# Patient Record
Sex: MALE | Race: WHITE | NOT HISPANIC OR LATINO | URBAN - METROPOLITAN AREA
[De-identification: names, ages, dates, MRNs, and addresses within clinical notes are randomized per-mention and may not be internally consistent; named-entity substitution may affect disease eponyms.]

---

## 2022-01-20 ENCOUNTER — TELEPHONE (OUTPATIENT)
Dept: UROLOGY | Facility: CLINIC | Age: 52
End: 2022-01-20
Payer: MEDICARE

## 2022-01-20 NOTE — TELEPHONE ENCOUNTER
----- Message from Xiao Jimmy sent at 1/20/2022 10:25 AM CST -----  Contact: Patient  Type: Patient Call Back         Who called: Patient         What is the request in detail: calling to sched from referral for BPH with urinary obstruction [N40.1, N13.8]  Chronic prostatitis [N41.1]; please advise         Can the clinic reply by [facility]? Call back         Would the patient rather a call back or a response via [facility]? Call back         Best call back number: [phone]         Additional Information: prefers mornings           Thank You

## 2022-01-20 NOTE — TELEPHONE ENCOUNTER
Scheduled pt for soonest availability per pt's morning preference at 9am on 4/8 with  and placed on waitlist. Spoke to pt and pt verbalized he understood and confirmed appt time and date.

## 2022-03-10 ENCOUNTER — OFFICE VISIT (OUTPATIENT)
Dept: HEMATOLOGY/ONCOLOGY | Facility: CLINIC | Age: 52
End: 2022-03-10
Payer: MEDICARE

## 2022-03-10 VITALS
WEIGHT: 190.69 LBS | TEMPERATURE: 98 F | HEIGHT: 69 IN | SYSTOLIC BLOOD PRESSURE: 150 MMHG | HEART RATE: 74 BPM | DIASTOLIC BLOOD PRESSURE: 88 MMHG | RESPIRATION RATE: 16 BRPM | BODY MASS INDEX: 28.24 KG/M2 | OXYGEN SATURATION: 98 %

## 2022-03-10 DIAGNOSIS — D72.829 LEUKOCYTOSIS, UNSPECIFIED TYPE: Primary | ICD-10-CM

## 2022-03-10 DIAGNOSIS — Z72.0 TOBACCO ABUSE: ICD-10-CM

## 2022-03-10 PROCEDURE — 99999 PR PBB SHADOW E&M-EST. PATIENT-LVL V: CPT | Mod: PBBFAC,,, | Performed by: INTERNAL MEDICINE

## 2022-03-10 PROCEDURE — 99215 OFFICE O/P EST HI 40 MIN: CPT | Mod: PBBFAC,PN | Performed by: INTERNAL MEDICINE

## 2022-03-10 PROCEDURE — 99204 OFFICE O/P NEW MOD 45 MIN: CPT | Mod: S$PBB,,, | Performed by: INTERNAL MEDICINE

## 2022-03-10 PROCEDURE — 99204 PR OFFICE/OUTPT VISIT, NEW, LEVL IV, 45-59 MIN: ICD-10-PCS | Mod: S$PBB,,, | Performed by: INTERNAL MEDICINE

## 2022-03-10 PROCEDURE — 99999 PR PBB SHADOW E&M-EST. PATIENT-LVL V: ICD-10-PCS | Mod: PBBFAC,,, | Performed by: INTERNAL MEDICINE

## 2022-03-10 NOTE — Clinical Note
Obtain cbc with path review, cmp ldh FISH for bcr-abl, DARIO 2 Kinase, flow cytometry, 3 view sinus xray, and low dose screening CT-Chest. Rtc with results.

## 2022-03-10 NOTE — PROGRESS NOTES
Chief complaint:  Leukocytosis    History of present illness:  The patient is a 52-year-old white gentleman who presents for evaluation of chronic leukocytosis.  Review of old laboratory as exists within the [facility] system dates back as far as 2018. Patient has had white counts ranging from 10 -18K.  During this time, patient has had normal indices, no evidence of anemia, and predominantly normal platelet counts.  Differentials have exhibited left shift.  Patient is a 1 pack per day cigarette smoker.  He has done so for 40 years.  He has never been screened for lung carcinoma.  Patient has had prior difficulties with sinusitis and has undergone sinus surgery.  He continues to suffer allergy symptoms, postnasal drip and cough.  Patient is not experiencing fevers, chills, painful lymphadenopathy, night sweats, and unintentional weight loss.  Patient does not routinely receive steroid medications and does not use supplemental testosterone.    Past medical history:  1. Hypertension  2. Hyperlipidemia  3. Insomnia  4. Erectile dysfunction  5. Benign prostatic hypertrophy  6. Status post sinus surgery  7. Spina bifida-status post surgical correction following birth    Allergies:  1. Lisinopril    Medications:  1. Norvasc 5 mg p.o. daily  2. Lipitor 40 mg p.o. daily  3. Cozaar 50 mg p.o. daily  4. Melatonin 3 mg nightly as needed for insomnia  5. Fish oil 1000 mg daily  6. Viagra 100 mg as needed  7. Flomax 0.4 mg p.o. daily    Family/social history:  One pack per day smoker times 40 years.  Patient has never been able to quit.  Patient does not consume alcohol.  Patient is retired from the [employer].  Father is  due to heart failure.  Mother is in good health.    Physical examination:  Well-developed, well-nourished, white gentleman, in no acute distress, who has a weight of 190.5 lb.  VITAL SIGNS: Documented  and reviewed this visit.  HEENT: Normocephalic, atraumatic. Oral mucosa pink and moist. Lips without  lesions. Tongue midline. Oropharynx clear. Nonicteric sclerae.   NECK: Supple, no adenopathy. No carotid bruits, thyromegaly or thyroid nodule.   HEART: Regular rate and rhythm without murmur, gallop or rub.   LUNGS: Clear to auscultation bilaterally. Normal respiratory effort.   ABDOMEN: Soft, nontender, nondistended with positive normoactive bowel sounds, no hepatosplenomegaly.   EXTREMITIES: No cyanosis, clubbing or edema. Distal pulses are intact.   AXILLAE AND GROIN: No palpable pathologic lymphadenopathy is appreciated.   SKIN: Intact/turgor normal   NEUROLOGIC: Cranial nerves II-XII grossly intact. Motor: Good muscle bulk and tone. Strength/sensory 5/5 throughout. Gait stable.     Laboratory:  Most recent studies were obtained on 03/03/2022.  White count 17, hemoglobin 14.1, hematocrit 41.6, platelets 335, MCV 95, RDW 13.3.  Differential is remarkable for 71% granulocytes, 20% lymphocytes, 6% monocytes, and 2% eosinophils.  Absolute neutrophil count is 12,000.  Most recent chemistries were obtained on 2/22/22.  Sodium 138, potassium 4.3, chloride 107, CO2 24, BUN 9, creatinine 0.9, glucose 96, calcium 8.8, liver function test within normal limits, GFR is greater than 60.     Impression:  1. Chronic, idiopathic leukocytosis with left shift.  2. Ongoing tobacco abuse.    Plan:  1. Obtain CBC with pathologist's review of the peripheral smear, CMP, LDH, FISH for bcr-ABL, JAK2 kinase mutation, flow cytometry, low-dose screening CT of the chest, and three view sinus film.  2. Return to clinic in earliest convenience to review results.    3. Counseled regarding smoking cessation.  Patient has accept a referral to smoking cessation program.    This note was created using voice recognition software and may contain grammatical errors.

## 2022-03-23 ENCOUNTER — HOSPITAL ENCOUNTER (OUTPATIENT)
Dept: RADIOLOGY | Facility: HOSPITAL | Age: 52
Discharge: HOME | End: 2022-03-23
Attending: INTERNAL MEDICINE
Payer: MEDICARE

## 2022-03-23 DIAGNOSIS — D72.829 LEUKOCYTOSIS, UNSPECIFIED TYPE: ICD-10-CM

## 2022-03-23 DIAGNOSIS — Z72.0 TOBACCO ABUSE: ICD-10-CM

## 2022-03-23 PROCEDURE — 71271 CT CHEST LUNG SCREENING LOW DOSE: ICD-10-PCS | Mod: 26,,, | Performed by: RADIOLOGY

## 2022-03-23 PROCEDURE — 70220 X-RAY EXAM OF SINUSES: CPT | Mod: TC,FY,PO

## 2022-03-23 PROCEDURE — 70220 XR SINUSES MIN 3 VIEWS: ICD-10-PCS | Mod: 26,,, | Performed by: RADIOLOGY

## 2022-03-23 PROCEDURE — 70220 X-RAY EXAM OF SINUSES: CPT | Mod: 26,,, | Performed by: RADIOLOGY

## 2022-03-23 PROCEDURE — 71271 CT THORAX LUNG CANCER SCR C-: CPT | Mod: TC,PO

## 2022-03-23 PROCEDURE — 71271 CT THORAX LUNG CANCER SCR C-: CPT | Mod: 26,,, | Performed by: RADIOLOGY

## 2022-03-28 ENCOUNTER — TELEPHONE (OUTPATIENT)
Dept: SMOKING CESSATION | Facility: CLINIC | Age: 52
End: 2022-03-28
Payer: MEDICARE

## 2022-04-06 ENCOUNTER — OFFICE VISIT (OUTPATIENT)
Dept: HEMATOLOGY/ONCOLOGY | Facility: CLINIC | Age: 52
End: 2022-04-06
Payer: MEDICARE

## 2022-04-06 VITALS
BODY MASS INDEX: 28.44 KG/M2 | DIASTOLIC BLOOD PRESSURE: 93 MMHG | SYSTOLIC BLOOD PRESSURE: 149 MMHG | HEART RATE: 88 BPM | OXYGEN SATURATION: 98 % | WEIGHT: 192 LBS | TEMPERATURE: 98 F | RESPIRATION RATE: 16 BRPM | HEIGHT: 69 IN

## 2022-04-06 DIAGNOSIS — D72.829 LEUKOCYTOSIS, UNSPECIFIED TYPE: Primary | ICD-10-CM

## 2022-04-06 DIAGNOSIS — J32.0 MAXILLARY SINUSITIS, UNSPECIFIED CHRONICITY: ICD-10-CM

## 2022-04-06 DIAGNOSIS — Z72.0 TOBACCO ABUSE: ICD-10-CM

## 2022-04-06 PROCEDURE — 99214 OFFICE O/P EST MOD 30 MIN: CPT | Mod: PBBFAC,PN | Performed by: INTERNAL MEDICINE

## 2022-04-06 PROCEDURE — 99214 PR OFFICE/OUTPT VISIT, EST, LEVL IV, 30-39 MIN: ICD-10-PCS | Mod: S$PBB,,, | Performed by: INTERNAL MEDICINE

## 2022-04-06 PROCEDURE — 99999 PR PBB SHADOW E&M-EST. PATIENT-LVL IV: CPT | Mod: PBBFAC,,, | Performed by: INTERNAL MEDICINE

## 2022-04-06 PROCEDURE — 99999 PR PBB SHADOW E&M-EST. PATIENT-LVL IV: ICD-10-PCS | Mod: PBBFAC,,, | Performed by: INTERNAL MEDICINE

## 2022-04-06 PROCEDURE — 99214 OFFICE O/P EST MOD 30 MIN: CPT | Mod: S$PBB,,, | Performed by: INTERNAL MEDICINE

## 2022-04-06 NOTE — PROGRESS NOTES
History of present illness:  The patient is a 52-year-old white gentleman who presents for evaluation of chronic leukocytosis.  Review of old laboratory as exists within the [facility] system dates back as far as July of 2018. Patient has had white counts ranging from 10 -18K.  During this time, patient has had normal indices, no evidence of anemia, and predominantly normal platelet counts.  Differentials have exhibited left shift.  Patient is a 1 pack per day cigarette smoker.  He has done so for 40 years.  He has never been screened for lung carcinoma.  Patient has had prior difficulties with sinusitis and has undergone sinus surgery.  He continues to suffer allergy symptoms, postnasal drip and cough.  Patient is not experiencing fevers, chills, painful lymphadenopathy, night sweats, and unintentional weight loss.  Patient does not routinely receive steroid medications and does not use supplemental testosterone.    Patient returns to clinic to review results of interval workup.  Since last office evaluation, patient has cut his smoking consumption by half.  Patient missed referral to smoking cessation program as 1 of his children have become ill.  He wishes to reschedule.    Physical examination:  Well-developed, well-nourished, white gentleman, in no acute distress, who has a weight of 192 lb (increased by 1.5 lb).  VITAL SIGNS: Documented  and reviewed this visit.  HEENT: Normocephalic, atraumatic. Oral mucosa pink and moist. Lips without lesions. Tongue midline. Oropharynx clear. Nonicteric sclerae.   NECK: Supple, no adenopathy. No carotid bruits, thyromegaly or thyroid nodule.   HEART: Regular rate and rhythm without murmur, gallop or rub.   LUNGS: Clear to auscultation bilaterally. Normal respiratory effort.   ABDOMEN: Soft, nontender, nondistended with positive normoactive bowel sounds, no hepatosplenomegaly.   EXTREMITIES: No cyanosis, clubbing or edema. Distal pulses are intact.   AXILLAE AND GROIN: No  palpable pathologic lymphadenopathy is appreciated.   SKIN: Intact/turgor normal   NEUROLOGIC: Cranial nerves II-XII grossly intact. Motor: Good muscle bulk and tone. Strength/sensory 5/5 throughout. Gait stable.     Laboratory:  White count 10.8, hemoglobin 14.6, hematocrit 42.3, platelets 317, absolute neutrophil count is 7100.  Pathologist review the peripheral smear does not reveal any morphologic abnormalities.  Sodium 138, potassium 4.8, chloride 106, CO2 23, BUN 10, creatinine 1.2, glucose 108, calcium 9.1, liver function tests are within normal limits, LDH is 135, GFR is greater than 60.  FISH for bcr-ABL is negative.  JAK2 kinase mutation is negative.    Three view sinus film:  Findings raise the possibility of acute left maxillary sinusitis.    Low-dose, spiral, screening CT of the chest:  Lung-RADS category 1 with annual follow-up recommended.      Impression:  1. Chronic leukocytosis with left shift likely reactive due to tobacco abuse and/or sinusitis.  2. Ongoing tobacco abuse-making active efforts at cessation.  3. Left maxillary sinusitis.    Plan:  1. Continue efforts at smoking cessation.  2. Reschedule referral to smoking cessation appointment.  3. Consult ENT for evaluation of left maxillary sinusitis.  4. Re-evaluate in 1 year with interval CBC and repeat spiral CT of the chest.      This note was created using voice recognition software and may contain grammatical errors.

## 2022-04-06 NOTE — Clinical Note
Please reschedule referral to smoking cessation program.  The patient missed this appointment due to illness of 1 of his children. ENT referral to Dr. Esteves for evaluation of left maxillary sinusitis. Return to clinic in 1 year with interval CBC and low-dose spiral CT of the chest prior to appointment.  Patient may see nurse practitioner for that visit.

## 2022-04-08 ENCOUNTER — OFFICE VISIT (OUTPATIENT)
Dept: UROLOGY | Facility: CLINIC | Age: 52
End: 2022-04-08
Payer: MEDICARE

## 2022-04-08 VITALS — WEIGHT: 192 LBS | HEIGHT: 69 IN | BODY MASS INDEX: 28.44 KG/M2

## 2022-04-08 DIAGNOSIS — Z30.09 VASECTOMY EVALUATION: ICD-10-CM

## 2022-04-08 DIAGNOSIS — R35.0 INCREASED URINARY FREQUENCY: ICD-10-CM

## 2022-04-08 DIAGNOSIS — N13.8 ENLARGED PROSTATE WITH URINARY OBSTRUCTION: Primary | ICD-10-CM

## 2022-04-08 DIAGNOSIS — N40.1 ENLARGED PROSTATE WITH URINARY OBSTRUCTION: Primary | ICD-10-CM

## 2022-04-08 LAB
BILIRUB SERPL-MCNC: NORMAL MG/DL
BLOOD URINE, POC: NORMAL
CLARITY, POC UA: CLEAR
COLOR, POC UA: YELLOW
GLUCOSE UR QL STRIP: NORMAL
KETONES UR QL STRIP: NORMAL
LEUKOCYTE ESTERASE URINE, POC: NORMAL
NITRITE, POC UA: NORMAL
PH, POC UA: 5.5
PROTEIN, POC: NORMAL
SPECIFIC GRAVITY, POC UA: 1.03
UROBILINOGEN, POC UA: 0.2

## 2022-04-08 PROCEDURE — 99213 OFFICE O/P EST LOW 20 MIN: CPT | Mod: PBBFAC,PO | Performed by: UROLOGY

## 2022-04-08 PROCEDURE — 99204 OFFICE O/P NEW MOD 45 MIN: CPT | Mod: S$PBB,,, | Performed by: UROLOGY

## 2022-04-08 PROCEDURE — 81002 URINALYSIS NONAUTO W/O SCOPE: CPT | Mod: PBBFAC,PO | Performed by: UROLOGY

## 2022-04-08 PROCEDURE — 99204 PR OFFICE/OUTPT VISIT, NEW, LEVL IV, 45-59 MIN: ICD-10-PCS | Mod: S$PBB,,, | Performed by: UROLOGY

## 2022-04-08 PROCEDURE — 99999 PR PBB SHADOW E&M-EST. PATIENT-LVL III: ICD-10-PCS | Mod: PBBFAC,,, | Performed by: UROLOGY

## 2022-04-08 PROCEDURE — 99999 PR PBB SHADOW E&M-EST. PATIENT-LVL III: CPT | Mod: PBBFAC,,, | Performed by: UROLOGY

## 2022-04-08 RX ORDER — DIAZEPAM 10 MG/1
10 TABLET ORAL ONCE
Qty: 1 TABLET | Refills: 0 | Status: SHIPPED | OUTPATIENT
Start: 2022-04-08 | End: 2022-09-06

## 2022-04-08 NOTE — PROGRESS NOTES
Subjective:       Patient ID: Willian Carr is a 52 y.o. male.    Chief Complaint: Other (Chronic prostatitis)    HPI     52-year-old with pain over his right hip and lower back.  The pain is constant but worse when he is walking.  He was diagnosed with prostatitis.  He has urinary frequency which has been a long-term problem..  He was given a trial of Flomax which he took for 1 week but this caused sexual side effects and he is now discontinued the medication.  He denies hematuria and dysuria.  He has not had any previous urinary tract infections.  He has no family history of prostate cancer.  We discussed alternative treatment options for BPH including surgery.  He says his symptoms are not very bothersome and he prefers not to take any additional medication at this time.  He also is interested in a vasectomy.  He has 3 kids and desires permanent sterility.  He is currently single. He says he has given this careful thought and he knows that vasectomy will result in permanent sterility.   I explained the procedure in detail.  We discussed the risks including bleeding, infection, hematoma, persistent pain and failure.  He knows that he will not be immediately sterile and that he should use alternative birth control until azospermia can be confirmed microscopically.  Discussed the expected post procedure pain and limitations.  He was given an Rx for Valium to be taken before procedure and he knows that he cannot drive home.  He was given written instructions and I answered all questions.    Urine dipstick shows negative for all components.    Past Medical History:   Diagnosis Date    Depression     Diabetes mellitus, type 2     Diverticula of intestine     Hyperlipidemia     Hypertension     PTSD (post-traumatic stress disorder)      Past Surgical History:   Procedure Laterality Date    SINUS SURGERY         Current Outpatient Medications:     amLODIPine (NORVASC) 5 MG tablet, Take 1 tablet (5 mg total) by  mouth once daily., Disp: 90 tablet, Rfl: 0    atorvastatin (LIPITOR) 40 MG tablet, Take 1 tablet (40 mg total) by mouth once daily., Disp: 90 tablet, Rfl: 0    losartan (COZAAR) 50 MG tablet, Take 1 tablet (50 mg total) by mouth once daily., Disp: 90 tablet, Rfl: 0    melatonin (MELATIN) 3 mg tablet, TAKE ONE TABLET BY MOUTH AT BEDTIME AS A SLEEP AID, Disp: , Rfl:     omega 3-dha-epa-fish oil 1,000 mg (120 mg-180 mg) Cap, TAKE ONE CAPSULE BY MOUTH TWICE A DAY FOR CARDIOVASCULAR HEALTH, Disp: , Rfl:     sildenafiL (VIAGRA) 100 MG tablet, TAKE ONE DAILY AS NEEDED FOR ERECTILE DYSFUNCTION, Disp: 30 tablet, Rfl: 0    tamsulosin (FLOMAX) 0.4 mg Cap, TAKE 1 CAPSULE(0.4 MG) BY MOUTH EVERY DAY, Disp: 90 capsule, Rfl: 0    diazePAM (VALIUM) 10 MG Tab, Take 1 tablet (10 mg total) by mouth once. for 1 dose, Disp: 1 tablet, Rfl: 0      Review of Systems   Constitutional: Negative for fever.   Eyes: Negative for visual disturbance.   Respiratory: Negative for shortness of breath.    Cardiovascular: Negative for chest pain.   Gastrointestinal: Negative for nausea.   Genitourinary: Positive for frequency. Negative for dysuria and hematuria.   Musculoskeletal: Negative for gait problem.   Skin: Negative for rash.   Neurological: Negative for seizures.   Psychiatric/Behavioral: Negative for confusion.       Objective:      Physical Exam  Vitals reviewed.   Constitutional:       Appearance: He is well-developed.   HENT:      Head: Normocephalic and atraumatic.   Eyes:      Conjunctiva/sclera: Conjunctivae normal.   Cardiovascular:      Rate and Rhythm: Normal rate.   Pulmonary:      Effort: Pulmonary effort is normal.   Abdominal:      Hernia: There is no hernia in the left inguinal area or right inguinal area.   Genitourinary:     Penis: Normal.       Testes: Normal.      Epididymis:      Right: Normal.      Left: Normal.      Prostate: Enlarged (40g, s/s/a). Not tender.      Rectum: No mass. Normal anal tone.      Comments:  Bilateral vas are palpable  Musculoskeletal:         General: Normal range of motion.   Skin:     General: Skin is warm and dry.      Findings: No rash.   Neurological:      Mental Status: He is alert and oriented to person, place, and time.         Assessment:       1. Enlarged prostate with urinary obstruction    2. Increased urinary frequency    3. Vasectomy evaluation        Plan:       Enlarged prostate with urinary obstruction  -     POCT URINE DIPSTICK WITHOUT MICROSCOPE  -     POCT Bladder Scan    Increased urinary frequency    Vasectomy evaluation  -     Vasectomy; Future    Other orders  -     diazePAM (VALIUM) 10 MG Tab; Take 1 tablet (10 mg total) by mouth once. for 1 dose  Dispense: 1 tablet; Refill: 0      schedule vasectomy next available date.

## 2022-05-05 ENCOUNTER — OFFICE VISIT (OUTPATIENT)
Dept: UROLOGY | Facility: CLINIC | Age: 52
End: 2022-05-05
Payer: MEDICARE

## 2022-05-05 DIAGNOSIS — Z30.09 VASECTOMY EVALUATION: ICD-10-CM

## 2022-05-05 PROCEDURE — 99999 PR PBB SHADOW E&M-EST. PATIENT-LVL II: ICD-10-PCS | Mod: PBBFAC,,, | Performed by: UROLOGY

## 2022-05-05 PROCEDURE — 99499 UNLISTED E&M SERVICE: CPT | Mod: S$PBB,,, | Performed by: UROLOGY

## 2022-05-05 PROCEDURE — 99212 OFFICE O/P EST SF 10 MIN: CPT | Mod: PBBFAC,PO | Performed by: UROLOGY

## 2022-05-05 PROCEDURE — 55250 REMOVAL OF SPERM DUCT(S): CPT | Mod: PBBFAC,PO | Performed by: UROLOGY

## 2022-05-05 PROCEDURE — 99499 NO LOS: ICD-10-PCS | Mod: S$PBB,,, | Performed by: UROLOGY

## 2022-05-05 PROCEDURE — 55250 REMOVAL OF SPERM DUCT(S): CPT | Mod: S$PBB,,, | Performed by: UROLOGY

## 2022-05-05 PROCEDURE — 99999 PR PBB SHADOW E&M-EST. PATIENT-LVL II: CPT | Mod: PBBFAC,,, | Performed by: UROLOGY

## 2022-05-05 PROCEDURE — 55250 PR REMOVAL OF SPERM DUCT(S): ICD-10-PCS | Mod: S$PBB,,, | Performed by: UROLOGY

## 2022-05-05 RX ORDER — HYDROCODONE BITARTRATE AND ACETAMINOPHEN 7.5; 325 MG/1; MG/1
1 TABLET ORAL EVERY 4 HOURS PRN
Qty: 25 TABLET | Refills: 0 | Status: SHIPPED | OUTPATIENT
Start: 2022-05-05 | End: 2022-09-06

## 2022-05-05 NOTE — PROGRESS NOTES
Procedure:  Bilateral Vasectomy    Diagnosis:  Desired sterility    Anesthesia:  Local and oral sedation with Valium    Blood loss:  None    Complications:  None    Specimen:  None    Indications:  Mr Carr has 3 kids and he desires permanent sterility.  He was previously evaluated and I discussed the procedure and risks and answered all question.  He is scheduled for vasectomy today and gives his written consent.    Procedure in detail:  After informed consent was obtained,  The genitals were prepped and draped in a sterile fashion.  Starting on the left side, the vas was palpated.  5 milliliter(s) on lidocaine was injected under the skin and around in in the vas sheath.  A small incision was made over the vas with an #11 blade.  The subcutaneous tissue was divided with a hemostat.  I grasped the vas with a vas looped forceps and externalized the vas.  The overlying sheath was divided with electrocautery.  A section of the vas was isolated and then completely excised.  The needle tip of the cautery was then placed in the proximal and distal vasa lumen and fulgurated.  Each end of the cut vas was buried in the sheath with a 3-0 chromic suture.  With gentle traction on the testes the vas and sheath were brought back into its normal anatomic position.  The skin was then closed with a single chromic suture.  The identical procedure was then performed on the right side.  The vas was isolated, another 5 milliliter(s) of lidocaine was injected,  an incision was made in the skin and the vas was externalized.  The sheath was incised and a section of vas measuring approximately 2 cm in length was excised.  The lumens were fulgurated and buried individually and the vas sheath.  The skin was then closed with a single chromic suture.  He tolerated the procedure well and there were no conditions.

## 2022-06-27 ENCOUNTER — OFFICE VISIT (OUTPATIENT)
Dept: OTOLARYNGOLOGY | Facility: CLINIC | Age: 52
End: 2022-06-27
Payer: MEDICARE

## 2022-06-27 VITALS — BODY MASS INDEX: 27.99 KG/M2 | WEIGHT: 189 LBS | HEIGHT: 69 IN

## 2022-06-27 DIAGNOSIS — J34.2 NASAL SEPTAL DEVIATION: Primary | ICD-10-CM

## 2022-06-27 DIAGNOSIS — J34.3 HYPERTROPHY OF BOTH INFERIOR NASAL TURBINATES: ICD-10-CM

## 2022-06-27 DIAGNOSIS — J32.0 MAXILLARY SINUSITIS, UNSPECIFIED CHRONICITY: ICD-10-CM

## 2022-06-27 DIAGNOSIS — Z72.0 TOBACCO ABUSE: ICD-10-CM

## 2022-06-27 PROCEDURE — 87070 CULTURE OTHR SPECIMN AEROBIC: CPT | Performed by: OTOLARYNGOLOGY

## 2022-06-27 PROCEDURE — 99204 PR OFFICE/OUTPT VISIT, NEW, LEVL IV, 45-59 MIN: ICD-10-PCS | Mod: 25,S$PBB,, | Performed by: OTOLARYNGOLOGY

## 2022-06-27 PROCEDURE — 99999 PR PBB SHADOW E&M-EST. PATIENT-LVL III: CPT | Mod: PBBFAC,,, | Performed by: OTOLARYNGOLOGY

## 2022-06-27 PROCEDURE — 99999 PR PBB SHADOW E&M-EST. PATIENT-LVL III: ICD-10-PCS | Mod: PBBFAC,,, | Performed by: OTOLARYNGOLOGY

## 2022-06-27 PROCEDURE — 99213 OFFICE O/P EST LOW 20 MIN: CPT | Mod: PBBFAC,PO | Performed by: OTOLARYNGOLOGY

## 2022-06-27 PROCEDURE — 99204 OFFICE O/P NEW MOD 45 MIN: CPT | Mod: 25,S$PBB,, | Performed by: OTOLARYNGOLOGY

## 2022-06-27 RX ORDER — FLUTICASONE PROPIONATE 50 MCG
2 SPRAY, SUSPENSION (ML) NASAL DAILY
Qty: 16 G | Refills: 11 | Status: SHIPPED | OUTPATIENT
Start: 2022-06-27

## 2022-06-27 NOTE — PROGRESS NOTES
Subjective:       Patient ID: Willian Carr is a 52 y.o. male.    Chief Complaint: Sinus Problem    Willian is here for sinus/nasal complaints. Symptoms have been present for many years.  He has a history of chronic sinusitis and has undergone sinus surgery > 10 years ago. He recently had plain films which showed L maxilary sinuitis. Other pertinent issues include chronic left shift leukocytosis and chronic tobacco abuse.   He reports no change to sinusitis episodes following surgery and had a Staph infection following sinus surgery which was prolonged.   He uses oral AH regularly. Has not used nasal steroids in many years. Uses saline rinses periodically. He reports up to 6 episodes of sinusitis per year but at some point stopped seeking antibiotics for them and lets them run their course, which is usually days to weeks.   Symptoms are typically bilateral and include post nasal drainage and congestion. Sense of smell is reduced.     Allergy testing: no  History of asthma: no              Social History     Tobacco Use   Smoking Status Current Every Day Smoker    Packs/day: 1.00    Types: Cigarettes   Smokeless Tobacco Never Used     Social History     Substance and Sexual Activity   Alcohol Use Yes    Comment: 12 pk / yr        Objective:        Constitutional:   He is oriented to person, place, and time. He appears well-developed and well-nourished. He appears alert. He is active. Normal speech.      Head:  Normocephalic and atraumatic. Head is without TMJ tenderness. No scars. Salivary glands normal.  Facial strength is normal.      Ears:    Right Ear: No drainage or swelling. No middle ear effusion.   Left Ear: No drainage or swelling.  No middle ear effusion.     Nose:  Septal deviation (superior mod R with moderate L spur along mid / floor) present. No mucosal edema, rhinorrhea or sinus tenderness. No turbinate hypertrophy.      Mouth/Throat  Oropharynx clear and moist without lesions or asymmetry, normal  uvula midline and mirror exam normal. Normal dentition. No uvula swelling, lacerations or trismus. No oropharyngeal exudate. Tonsils present, +1.  Tonsillar erythema, tonsillar exudate.      Neck:  Full range of motion with neck supple and no adenopathy. Thyroid tenderness is present. No tracheal deviation, no edema, no erythema, normal range of motion, no stridor, no crepitus and no neck rigidity present. No thyroid mass present.     Cardiovascular:   Intact distal pulses and normal pulses.      Pulmonary/Chest:   Effort normal and breath sounds normal. No stridor.     Psychiatric:   His speech is normal and behavior is normal. His mood appears not anxious. His affect is not labile.     Neurological:   He is alert and oriented to person, place, and time. No sensory deficit.     Skin:   No abrasions, lacerations, lesions, or rashes. No abrasion and no bruising noted.         Tests / Results:  Reviewed XR sinus myself: partial opacification of L max sinus    FINDINGS:  No displaced fracture or traumatic malalignment.  There is partial opacification of the left mastoid sic [maxillary] sinus along the floor.  Remaining paranasal sinuses are clear.     Impression:     Findings raise the possibility of acute left maxillary sinusitis.      Name: Willian Carr     Pre-procedure diagnosis: Nasal septal deviation [J34.2]  Post-procedure diagnosis: Same    Procedure: Bilateral nasal endoscopy  Anesthesia:  4% Lidocaine and 0.25% Phenylephrine Topical    Indication: This procedure is indicated as anterior rhinoscopy is not sufficient to account for all of the patients symptoms.     Procedure: Risks, benefits, and alternatives of the procedure were discussed with the patient, and consent was obtained to perform a nasal endoscopy.  The nasal cavity was sprayed with a topical decongestant and topical anesthetic. After adequate anesthesia was obtained, the scope was passed into each nostril independently.  The nasal cavities  (including inferior turbinates, middle turbinates, inferior meatus, middle meatus, superior meatus) nasopharynx, choana, eustachian tube, fossa of Rosenmüller, and adenoids were examined. All findings were normal with exception of description below. At the end of the examination, the scope was removed. The patient tolerated the procedure well with no complications.     LEFT: synechiae of L middle meatus with purulence at outflow. cultured  RIGHT: Synechiae at middle meatus    Assessment:       1. Nasal septal deviation    2. Maxillary sinusitis, unspecified chronicity    3. Hypertrophy of both inferior nasal turbinates    4. Tobacco abuse          Plan:         Culture obtained  Will treat accordingly  Flonase and saline regularly  Pending response, will obtain CT if persistent symptoms  Discussed tobacco relationship with rhinitis

## 2022-06-30 ENCOUNTER — PATIENT MESSAGE (OUTPATIENT)
Dept: OTOLARYNGOLOGY | Facility: CLINIC | Age: 52
End: 2022-06-30
Payer: MEDICARE

## 2022-06-30 DIAGNOSIS — J32.0 MAXILLARY SINUSITIS, UNSPECIFIED CHRONICITY: Primary | ICD-10-CM

## 2022-06-30 DIAGNOSIS — Z72.0 TOBACCO ABUSE: ICD-10-CM

## 2022-06-30 DIAGNOSIS — J34.2 NASAL SEPTAL DEVIATION: ICD-10-CM

## 2022-06-30 DIAGNOSIS — J34.3 HYPERTROPHY OF BOTH INFERIOR NASAL TURBINATES: ICD-10-CM

## 2022-06-30 LAB — BACTERIA SPEC AEROBE CULT: NO GROWTH

## 2022-06-30 RX ORDER — CEFDINIR 300 MG/1
300 CAPSULE ORAL 2 TIMES DAILY
Qty: 28 CAPSULE | Refills: 0 | Status: SHIPPED | OUTPATIENT
Start: 2022-06-30 | End: 2022-07-14

## 2022-06-30 NOTE — PROGRESS NOTES
Willian,  Your culture did not show a predominant growth. I am sending an antibiotic to the pharmacy for 2 weeks. I'd like you to call with an update after completion and if you are not better, we will get a CT of the area to evaluate whether we need to open the sinuses.      Vernon Boykin MD  Otolaryngology - Head and Neck Surgery  Office: [phone]  Cell: [phone]  Fax: [phone]     This message was generated using voice dictation. Please excuse any errors that may have been created by the transcription software.

## 2022-09-01 ENCOUNTER — PATIENT MESSAGE (OUTPATIENT)
Dept: HEMATOLOGY/ONCOLOGY | Facility: CLINIC | Age: 52
End: 2022-09-01
Payer: MEDICARE

## 2023-04-05 ENCOUNTER — HOSPITAL ENCOUNTER (OUTPATIENT)
Dept: RADIOLOGY | Facility: HOSPITAL | Age: 53
Discharge: HOME | End: 2023-04-05
Attending: INTERNAL MEDICINE
Payer: MEDICARE

## 2023-04-05 ENCOUNTER — OFFICE VISIT (OUTPATIENT)
Dept: HEMATOLOGY/ONCOLOGY | Facility: CLINIC | Age: 53
End: 2023-04-05
Payer: MEDICARE

## 2023-04-05 VITALS
HEIGHT: 69 IN | WEIGHT: 195.56 LBS | OXYGEN SATURATION: 97 % | SYSTOLIC BLOOD PRESSURE: 150 MMHG | HEART RATE: 87 BPM | DIASTOLIC BLOOD PRESSURE: 95 MMHG | RESPIRATION RATE: 18 BRPM | TEMPERATURE: 98 F | BODY MASS INDEX: 28.96 KG/M2

## 2023-04-05 DIAGNOSIS — Z72.0 TOBACCO ABUSE: ICD-10-CM

## 2023-04-05 DIAGNOSIS — D72.829 LEUKOCYTOSIS, UNSPECIFIED TYPE: ICD-10-CM

## 2023-04-05 DIAGNOSIS — I10 PRIMARY HYPERTENSION: ICD-10-CM

## 2023-04-05 PROBLEM — F43.10 PTSD (POST-TRAUMATIC STRESS DISORDER): Status: ACTIVE | Noted: 2023-04-05

## 2023-04-05 PROBLEM — E78.5 HLD (HYPERLIPIDEMIA): Status: ACTIVE | Noted: 2023-04-05

## 2023-04-05 PROCEDURE — 71271 CT THORAX LUNG CANCER SCR C-: CPT | Mod: TC,PO

## 2023-04-05 PROCEDURE — 71271 CT CHEST LUNG SCREENING LOW DOSE: ICD-10-PCS | Mod: 26,,, | Performed by: RADIOLOGY

## 2023-04-05 PROCEDURE — 99999 PR PBB SHADOW E&M-EST. PATIENT-LVL III: ICD-10-PCS | Mod: PBBFAC,,, | Performed by: NURSE PRACTITIONER

## 2023-04-05 PROCEDURE — 99213 PR OFFICE/OUTPT VISIT, EST, LEVL III, 20-29 MIN: ICD-10-PCS | Mod: S$PBB,,, | Performed by: NURSE PRACTITIONER

## 2023-04-05 PROCEDURE — 71271 CT THORAX LUNG CANCER SCR C-: CPT | Mod: 26,,, | Performed by: RADIOLOGY

## 2023-04-05 PROCEDURE — 99213 OFFICE O/P EST LOW 20 MIN: CPT | Mod: S$PBB,,, | Performed by: NURSE PRACTITIONER

## 2023-04-05 PROCEDURE — 99213 OFFICE O/P EST LOW 20 MIN: CPT | Mod: PBBFAC,25,PN | Performed by: NURSE PRACTITIONER

## 2023-04-05 PROCEDURE — 99999 PR PBB SHADOW E&M-EST. PATIENT-LVL III: CPT | Mod: PBBFAC,,, | Performed by: NURSE PRACTITIONER

## 2023-04-05 NOTE — PROGRESS NOTES
Subjective:      Name: Willian Carr  : 1970  MRN: [id number]    CC:  Evaluation of labs - chronic leukocytosis    HPI:   Willian Carr is a 53 y.o. male who presents for evaluation of chronic leukocytosis.    PMHx:  Depression, T2DM, HTN, HLD, PTSD  History:  Review of old laboratory as exists within the [facility] system dates back as far as 2018. Patient has had white counts ranging from 10 -18K.    During this time, patient has had normal indices, no evidence of anemia, and predominantly normal platelet counts.  Differentials have exhibited left shift.    Patient is a 1 pack per day cigarette smoker.  He has done so for 40 years.  He has never been screened for lung carcinoma.    Patient had prior difficulties with sinusitis and has undergone sinus surgery.    Patient was not experiencing fevers, chills, painful lymphadenopathy, night sweats, and unintentional weight loss.    Patient did not routinely receive steroid medications and did not use supplemental testosterone.     22: Cut his smoking consumption by half.  Patient missed referral to smoking cessation program as 1 of his children have become ill.     Today:  23  Presents to the clinic today for evaluation & review of labs.  He reports that he has cut down considerably on smoking.  Denies any unexplained weigh loss, drenching night sweats, lymphadenopathy, fevers, chills, recurring illnesses, etc.    Past Medical History:   Diagnosis Date    Depression     Diabetes mellitus, type 2     Diverticula of intestine     Hyperlipidemia     Hypertension     PTSD (post-traumatic stress disorder)        Past Surgical History:   Procedure Laterality Date    SINUS SURGERY         Family History   Problem Relation Age of Onset    Heart disease Father     Muscular dystrophy Father        Social History     Socioeconomic History    Marital status: Single   Tobacco Use    Smoking status: Every Day     Packs/day: 1.00     Types: Cigarettes     "Smokeless tobacco: Never   Substance and Sexual Activity    Alcohol use: Yes     Comment: 12 pk / yr    Drug use: No       Review of patient's allergies indicates:   Allergen Reactions    Lisinopril Swelling       Review of Systems   All other systems reviewed and are negative.         Objective:     Vitals:    04/05/23 1442   BP: (!) 150/95   BP Location: Left arm   Patient Position: Sitting   BP Method: Large (Automatic)   Pulse: 87   Resp: 18   Temp: 97.8 °F (36.6 °C)   TempSrc: Temporal   SpO2: 97%   Weight: 88.7 kg (195 lb 8.8 oz)   Height: 5' 9" (1.753 m)        Physical Exam  Vitals reviewed.   Constitutional:       General: He is not in acute distress.  HENT:      Head: Normocephalic and atraumatic.   Cardiovascular:      Pulses: Normal pulses.   Pulmonary:      Effort: Pulmonary effort is normal.      Breath sounds: No wheezing.   Abdominal:      General: Bowel sounds are normal.      Palpations: Abdomen is soft.      Tenderness: There is no abdominal tenderness.   Musculoskeletal:      Cervical back: Neck supple.      Right lower leg: No edema.      Left lower leg: No edema.   Lymphadenopathy:      Cervical: No cervical adenopathy.      Upper Body:      Right upper body: No supraclavicular or axillary adenopathy.      Left upper body: No supraclavicular or axillary adenopathy.      Lower Body: No right inguinal adenopathy. No left inguinal adenopathy.   Skin:     General: Skin is warm and dry.   Neurological:      Mental Status: He is alert and oriented to person, place, and time.   Psychiatric:         Behavior: Behavior normal.         Thought Content: Thought content normal.           Current Outpatient Medications on File Prior to Visit   Medication Sig    amLODIPine (NORVASC) 5 MG tablet TAKE 1 TABLET DAILY    atorvastatin (LIPITOR) 40 MG tablet TAKE 1 TABLET DAILY    fluticasone propionate (FLONASE) 50 mcg/actuation nasal spray 2 sprays (100 mcg total) by Each Nostril route once daily.    losartan " (COZAAR) 50 MG tablet TAKE 1 TABLET DAILY    melatonin (MELATIN) 3 mg tablet TAKE ONE TABLET BY MOUTH AT BEDTIME AS A SLEEP AID    omega 3-dha-epa-fish oil 1,000 mg (120 mg-180 mg) Cap TAKE ONE CAPSULE BY MOUTH TWICE A DAY FOR CARDIOVASCULAR HEALTH    sildenafiL (VIAGRA) 100 MG tablet TAKE ONE DAILY AS NEEDED FOR ERECTILE DYSFUNCTION     No current facility-administered medications on file prior to visit.       Lab Results   Component Value Date    WBC 9.68 04/05/2023    RBC 4.71 04/05/2023    HGB 15.1 04/05/2023    HCT 42.6 04/05/2023    MCV 90 04/05/2023    MCH 32.1 (H) 04/05/2023    MCHC 35.4 04/05/2023    RDW 12.3 04/05/2023     04/05/2023    MPV 9.9 04/05/2023    GRAN 5.3 04/05/2023    GRAN 54.8 04/05/2023    LYMPH 3.3 04/05/2023    LYMPH 33.8 04/05/2023    MONO 0.8 04/05/2023    MONO 8.6 04/05/2023    EOS 0.2 04/05/2023    BASO 0.08 04/05/2023    EOSINOPHIL 1.7 04/05/2023    BASOPHIL 0.8 04/05/2023       CT Chest Lung Screening Low Dose  Narrative: EXAMINATION:  CT CHEST LUNG SCREENING LOW DOSE    CLINICAL HISTORY:  one year follow up as recommended by radiology; Tobacco use    TECHNIQUE:  CT of the thorax was performed with low dose, lung screening protocol.  No contrast was administered.  Sagittal and coronal reconstructions were obtained.    COMPARISON:  03/23/2022    FINDINGS:  Lungs: Single stable 0.3 cm solid nodule abutting the pleural margin within the inferior lingula (series 4, image 342).  No other suspicious pulmonary nodules or masses which require further evaluation.  The lungs show  findings consistent with emphysema.  No consolidation.    Pleura:   No effusion or pneumothorax.    Heart and pericardium: Normal size without effusion.    Aorta and vasculature: Atherosclerosis including coronary arteries.    Chest wall and skeletal structures: Unremarkable except age-appropriate degenerative changes.    Upper abdomen: Few scattered colonic diverticula.  No acute abnormality.  Impression:  Lung-RADS Category:  2 - Benign Appearance or Behavior - continue annual screening with LDCT in 12 months.    Clinically or potentially clinically significant non lung cancer finding:  None.    Prior Lung Cancer Modifier:  No history of prior lung cancer.    Electronically signed by: Carlyle Roman  Date:    04/05/2023  Time:    08:32       All pertinent labs reviewed    Assessment:       1. Leukocytosis, unspecified type    2. Tobacco abuse    3. Primary hypertension         Plan:     Leukocytosis, unspecified type    Tobacco abuse    Primary hypertension    Leukocytosis - stable  Tobacco abuse - continue efforts to stop smoking  HTN - follow with PCP  Follow with PCP; return to clinic if needed.          Route Chart for Scheduling    Med Onc Chart Routing      Follow up with physician . Follow up with PCP   Follow up with [facility] No follow up needed.   Infusion scheduling note    Injection scheduling note    Labs    Imaging    Pharmacy appointment    Other referrals          25 minutes were spent in coordination of patient's care, record review and counseling.

## 2023-10-04 ENCOUNTER — PATIENT MESSAGE (OUTPATIENT)
Dept: ADMINISTRATIVE | Facility: OTHER | Age: 53
End: 2023-10-04
Payer: MEDICARE